# Patient Record
Sex: FEMALE | Race: WHITE | HISPANIC OR LATINO | Employment: OTHER | URBAN - METROPOLITAN AREA
[De-identification: names, ages, dates, MRNs, and addresses within clinical notes are randomized per-mention and may not be internally consistent; named-entity substitution may affect disease eponyms.]

---

## 2019-05-02 PROBLEM — N87.9 CERVICAL DYSPLASIA: Status: ACTIVE | Noted: 2019-05-02

## 2019-05-02 PROBLEM — R10.2 PELVIC PAIN IN FEMALE: Status: ACTIVE | Noted: 2019-05-02

## 2019-09-16 PROBLEM — G56.01 CARPAL TUNNEL SYNDROME OF RIGHT WRIST: Status: ACTIVE | Noted: 2019-09-16

## 2019-09-16 PROBLEM — K76.0 FATTY LIVER DISEASE, NONALCOHOLIC: Status: ACTIVE | Noted: 2019-09-16

## 2020-02-03 PROBLEM — N87.1 MODERATE DYSPLASIA OF CERVIX (CIN II): Status: ACTIVE | Noted: 2017-03-20

## 2020-02-03 PROBLEM — F41.9 ANXIETY: Status: ACTIVE | Noted: 2020-02-03

## 2020-02-03 PROBLEM — K59.00 CONSTIPATION: Status: ACTIVE | Noted: 2019-06-14

## 2020-02-03 PROBLEM — Z90.710 S/P LAPAROSCOPIC HYSTERECTOMY: Status: ACTIVE | Noted: 2019-06-04

## 2020-02-03 PROBLEM — R74.01 TRANSAMINITIS: Status: ACTIVE | Noted: 2019-06-14

## 2020-02-05 PROBLEM — R35.89 POLYURIA: Chronic | Status: ACTIVE | Noted: 2020-02-05

## 2020-03-16 PROBLEM — N39.46 MIXED INCONTINENCE URGE AND STRESS: Status: ACTIVE | Noted: 2020-03-16

## 2020-05-18 PROBLEM — F43.10 PTSD (POST-TRAUMATIC STRESS DISORDER): Status: ACTIVE | Noted: 2020-02-03

## 2020-05-18 PROBLEM — F33.1 MODERATE EPISODE OF RECURRENT MAJOR DEPRESSIVE DISORDER: Status: ACTIVE | Noted: 2020-05-18

## 2020-06-08 PROBLEM — R11.2 INTRACTABLE NAUSEA AND VOMITING: Status: ACTIVE | Noted: 2020-06-08

## 2020-06-08 PROBLEM — R10.11 RIGHT UPPER QUADRANT ABDOMINAL PAIN: Status: ACTIVE | Noted: 2020-06-08

## 2020-06-09 PROBLEM — R10.11 RIGHT UPPER QUADRANT ABDOMINAL PAIN: Status: RESOLVED | Noted: 2020-06-08 | Resolved: 2020-06-09

## 2020-06-09 PROBLEM — R11.2 INTRACTABLE NAUSEA AND VOMITING: Status: RESOLVED | Noted: 2020-06-08 | Resolved: 2020-06-09

## 2020-06-19 PROBLEM — R10.9 FLANK PAIN, ACUTE: Status: ACTIVE | Noted: 2020-06-19

## 2020-06-19 PROBLEM — R10.9 ABDOMINAL PAIN: Status: ACTIVE | Noted: 2020-06-19

## 2020-06-20 PROBLEM — R10.9 ABDOMINAL PAIN: Status: RESOLVED | Noted: 2020-06-19 | Resolved: 2020-06-20

## 2020-06-20 PROBLEM — K80.50 BILIARY COLIC: Status: ACTIVE | Noted: 2020-06-08

## 2020-07-07 DIAGNOSIS — U07.1 COVID-19 VIRUS DETECTED: ICD-10-CM

## 2020-07-12 PROBLEM — R11.10 INTRACTABLE VOMITING: Status: ACTIVE | Noted: 2020-07-12

## 2020-07-12 PROBLEM — R10.11 RIGHT UPPER QUADRANT ABDOMINAL PAIN: Status: ACTIVE | Noted: 2020-07-12

## 2020-07-13 PROBLEM — R32 URINARY INCONTINENCE: Status: ACTIVE | Noted: 2020-07-13

## 2020-07-13 PROBLEM — K21.9 GERD (GASTROESOPHAGEAL REFLUX DISEASE): Status: ACTIVE | Noted: 2020-07-13

## 2020-07-13 PROBLEM — U07.1 COVID-19: Status: ACTIVE | Noted: 2020-07-13

## 2020-07-15 PROBLEM — R11.10 INTRACTABLE VOMITING: Status: RESOLVED | Noted: 2020-07-12 | Resolved: 2020-07-15

## 2020-07-25 PROBLEM — E66.9 CLASS 1 OBESITY IN ADULT: Status: ACTIVE | Noted: 2020-07-25

## 2020-07-25 PROBLEM — K80.20 SYMPTOMATIC CHOLELITHIASIS: Status: ACTIVE | Noted: 2020-07-25

## 2020-07-26 PROBLEM — I95.9 HYPOTENSION: Status: ACTIVE | Noted: 2020-07-26

## 2020-07-27 PROBLEM — U07.1 COVID-19: Status: RESOLVED | Noted: 2020-07-13 | Resolved: 2020-07-27

## 2020-07-27 PROBLEM — M25.511 ACUTE PAIN OF RIGHT SHOULDER: Status: ACTIVE | Noted: 2020-07-27

## 2020-07-27 PROBLEM — R11.10 INTRACTABLE VOMITING: Status: RESOLVED | Noted: 2020-07-12 | Resolved: 2020-07-27

## 2020-07-27 PROBLEM — K80.50 BILIARY COLIC: Status: RESOLVED | Noted: 2020-06-08 | Resolved: 2020-07-27

## 2020-07-27 PROBLEM — K80.20 SYMPTOMATIC CHOLELITHIASIS: Status: RESOLVED | Noted: 2020-07-25 | Resolved: 2020-07-27

## 2020-07-30 PROBLEM — F33.1 MODERATE EPISODE OF RECURRENT MAJOR DEPRESSIVE DISORDER: Chronic | Status: ACTIVE | Noted: 2020-05-18

## 2020-11-18 PROBLEM — F31.81 BIPOLAR II DISORDER, MOST RECENT EPISODE MAJOR DEPRESSIVE: Chronic | Status: ACTIVE | Noted: 2020-05-18

## 2020-11-18 PROBLEM — F31.81 BIPOLAR II DISORDER, MOST RECENT EPISODE MAJOR DEPRESSIVE: Status: ACTIVE | Noted: 2020-05-18

## 2021-01-12 PROBLEM — R10.2 PELVIC PAIN IN FEMALE: Status: RESOLVED | Noted: 2019-05-02 | Resolved: 2021-01-12

## 2021-01-25 PROBLEM — Z98.890 HISTORY OF SUBURETHRAL SLING PROCEDURE: Status: ACTIVE | Noted: 2021-01-25

## 2021-03-26 PROBLEM — N63.10 BREAST MASS, RIGHT: Status: ACTIVE | Noted: 2021-03-26

## 2021-05-12 ENCOUNTER — PATIENT MESSAGE (OUTPATIENT)
Dept: RESEARCH | Facility: HOSPITAL | Age: 36
End: 2021-05-12

## 2021-06-05 PROBLEM — N20.1 OBSTRUCTION OF LEFT URETEROPELVIC JUNCTION (UPJ) DUE TO STONE: Status: ACTIVE | Noted: 2021-06-05

## 2021-06-05 PROBLEM — N20.1 LEFT URETERAL CALCULUS: Status: ACTIVE | Noted: 2021-06-05

## 2021-06-18 PROBLEM — Z01.818 PRE-OP EVALUATION: Status: ACTIVE | Noted: 2021-06-18

## 2021-11-02 PROBLEM — M54.9 INTRACTABLE BACK PAIN: Status: ACTIVE | Noted: 2021-11-02

## 2021-11-17 PROBLEM — M54.50 LUMBAR BACK PAIN: Status: ACTIVE | Noted: 2021-11-02

## 2022-02-23 PROBLEM — N39.46 MIXED INCONTINENCE URGE AND STRESS: Status: RESOLVED | Noted: 2020-03-16 | Resolved: 2022-02-23

## 2022-02-23 PROBLEM — R32 URINARY INCONTINENCE: Status: RESOLVED | Noted: 2020-07-13 | Resolved: 2022-02-23

## 2022-03-01 PROBLEM — G47.00 INSOMNIA: Status: ACTIVE | Noted: 2022-03-01

## 2022-03-20 PROBLEM — N20.0 BILATERAL NEPHROLITHIASIS: Status: ACTIVE | Noted: 2022-03-20

## 2022-03-20 PROBLEM — R11.0 NAUSEA: Status: ACTIVE | Noted: 2022-03-20

## 2022-05-14 PROBLEM — N20.1 OBSTRUCTION OF LEFT URETEROPELVIC JUNCTION (UPJ) DUE TO STONE: Status: RESOLVED | Noted: 2021-06-05 | Resolved: 2022-05-14

## 2022-05-14 PROBLEM — R11.10 INTRACTABLE VOMITING: Status: RESOLVED | Noted: 2020-07-12 | Resolved: 2022-05-14

## 2022-11-22 PROBLEM — M54.12 CERVICAL RADICULOPATHY: Status: ACTIVE | Noted: 2022-11-22

## 2023-05-21 PROBLEM — R10.31 RIGHT LOWER QUADRANT ABDOMINAL PAIN: Status: ACTIVE | Noted: 2023-05-21

## 2023-05-24 ENCOUNTER — PATIENT OUTREACH (OUTPATIENT)
Dept: ADMINISTRATIVE | Facility: CLINIC | Age: 38
End: 2023-05-24

## 2023-05-24 NOTE — PROGRESS NOTES
C3 nurse attempted to contact Aiden Garcia  for a TCC post hospital discharge follow up call. No answer. Left voicemail with callback information. The patient does not have a scheduled HOSFU appointment. Message sent to PCP staff for assistance with scheduling visit with patient.

## 2023-05-24 NOTE — PROGRESS NOTES
C3 nurse spoke with Aiden Garcia  for a TCC post hospital discharge follow up call. The patient does not have a scheduled HOSFU appointment with Anthony Wise MD  within 5-7 days post hospital discharge date 5/23/23. C3 nurse was unable to schedule HOSFU appointment in [facility].    Message sent to PCP staff requesting they contact patient and schedule follow up appointment.

## 2023-05-26 ENCOUNTER — NURSE TRIAGE (OUTPATIENT)
Dept: ADMINISTRATIVE | Facility: CLINIC | Age: 38
End: 2023-05-26

## 2023-05-26 NOTE — TELEPHONE ENCOUNTER
Spoke with pt. Confirmed she is enroute to the ED for eval of intractable pain. Messaged gen surg team [facility]risti/Leonel.

## 2023-05-26 NOTE — TELEPHONE ENCOUNTER
OOC outgoing call -     Pt c/o had appendix removal on Sunday d/c on tues, pain medication not helping, can barely get out of bed, pain 9.5/10 at incision site. Post op protocol followed and pt advised to go to the ER now for further assessment and care, don't drive yourself and if you cannot make it there for any reason to call 911 now instead for this may be considered a medical emergency, education provided on possible infection or complication and need to be reevaluated and tx in the er now. Alert and oriented, Pt agrees to follow through with dispo. Encounter routed to PCP/staff and sx/staff as high prority.   Reason for Disposition   SEVERE pain in the incision    Additional Information   Negative: Major abdominal surgical incision and wound gaping open with visible internal organs   Negative: Sounds like a life-threatening emergency to the triager   Negative: Bleeding from incision and won't stop after 10 minutes of direct pressure   Negative: Bleeding (more than a few drops) from incision and after blood vessel surgery (e.g., carotidendarterectomy, femoral bypass graft, kidney dialysis fistula, tracheostomy)   Negative: Bright red, wide-spread, sunburn-like rash    Protocols used: Post-Op Incision Symptoms and [id number]

## 2023-08-08 ENCOUNTER — PES CALL (OUTPATIENT)
Dept: ADMINISTRATIVE | Facility: CLINIC | Age: 38
End: 2023-08-08

## 2023-09-12 PROBLEM — Z90.49 HISTORY OF LAPAROSCOPIC APPENDECTOMY: Status: ACTIVE | Noted: 2023-09-12

## 2023-09-12 PROBLEM — R11.14 BILIOUS VOMITING WITH NAUSEA: Status: ACTIVE | Noted: 2022-03-20

## 2023-09-14 PROBLEM — R10.9 LEFT FLANK PAIN: Status: ACTIVE | Noted: 2023-09-14

## 2023-09-14 PROBLEM — R34 DECREASED URINE OUTPUT: Status: ACTIVE | Noted: 2023-09-14

## 2023-09-18 ENCOUNTER — PATIENT OUTREACH (OUTPATIENT)
Dept: ADMINISTRATIVE | Facility: CLINIC | Age: 38
End: 2023-09-18

## 2023-09-18 ENCOUNTER — PATIENT MESSAGE (OUTPATIENT)
Dept: ADMINISTRATIVE | Facility: CLINIC | Age: 38
End: 2023-09-18

## 2023-09-18 NOTE — PROGRESS NOTES
C3 nurse attempted to contact Aiden Garcia  for a TCC post hospital discharge follow up call. No answer. Left voicemail with callback information. The patient has a scheduled HOSFU appointment with TIMMY Wise on 09/25/2023 @ 1100.

## 2024-02-22 ENCOUNTER — PATIENT MESSAGE (OUTPATIENT)
Dept: URGENT CARE | Facility: CLINIC | Age: 39
End: 2024-02-22
Payer: MEDICARE

## 2024-03-01 PROBLEM — K58.9 IRRITABLE BOWEL SYNDROME: Status: ACTIVE | Noted: 2024-03-01

## 2024-04-14 PROBLEM — R10.12 LEFT UPPER QUADRANT PAIN: Status: ACTIVE | Noted: 2024-04-14

## 2024-04-18 ENCOUNTER — PATIENT MESSAGE (OUTPATIENT)
Dept: ADMINISTRATIVE | Facility: CLINIC | Age: 39
End: 2024-04-18
Payer: MEDICARE

## 2024-04-18 ENCOUNTER — PATIENT OUTREACH (OUTPATIENT)
Dept: ADMINISTRATIVE | Facility: CLINIC | Age: 39
End: 2024-04-18
Payer: MEDICARE

## 2024-04-18 NOTE — PROGRESS NOTES
C3 nurse attempted to contact Aiden Garcia for a TCC post hospital discharge follow up call. No answer. Left voicemail with callback information. The patient has a scheduled [facility] appointment with Eliu Meadows on 04/23/2024 @ 1130.

## 2024-04-19 NOTE — PROGRESS NOTES
C3 nurse attempted to contact Aiden Garcia for a TCC post hospital discharge follow up call. No answer. Left voicemail with callback information. The patient has a scheduled [facility] appointment with Eliu Meadows on 04/23/2024 @ 1130.

## 2024-04-22 ENCOUNTER — NURSE TRIAGE (OUTPATIENT)
Dept: ADMINISTRATIVE | Facility: CLINIC | Age: 39
End: 2024-04-22
Payer: MEDICARE

## 2024-04-22 NOTE — PROGRESS NOTES
C3 nurse attempted to contact Aiden Garcia for a TCC post hospital discharge follow up call. No answer. Left voicemail with callback information. The patient has a scheduled [facility] appointment with Eliu Meadows on 04/23/2024 @ 1130.

## 2024-04-22 NOTE — TELEPHONE ENCOUNTER
Teri reports abdominal pain, nausea and diarrhea. Advised pt per triage protocol to go to nearest ED now for physician evelyne. Instructed to call  now if no immediate . V/u.   Reason for Disposition   [1] SEVERE pain (e.g., excruciating) AND [2] present > 1 hour    Additional Information   Negative: Shock suspected (e.g., cold/pale/clammy skin, too weak to stand, low BP, rapid pulse)   Negative: Difficult to awaken or acting confused (e.g., disoriented, slurred speech)   Negative: Passed out (i.e., lost consciousness, collapsed and was not responding)   Negative: Sounds like a life-threatening emergency to the triager    Protocols used: Abdominal Pain - Female-A-AH

## 2024-04-26 ENCOUNTER — ON-DEMAND VIRTUAL (OUTPATIENT)
Dept: URGENT CARE | Facility: CLINIC | Age: 39
End: 2024-04-26
Payer: MEDICARE

## 2024-04-26 DIAGNOSIS — R19.7 DIARRHEA, UNSPECIFIED TYPE: ICD-10-CM

## 2024-04-26 DIAGNOSIS — R10.9 ABDOMINAL PAIN, UNSPECIFIED ABDOMINAL LOCATION: Primary | ICD-10-CM

## 2024-04-26 PROCEDURE — 99203 OFFICE O/P NEW LOW 30 MIN: CPT | Mod: 95,,, | Performed by: NURSE PRACTITIONER

## 2024-04-26 NOTE — PROGRESS NOTES
Subjective:      Patient ID: Aiden Garcia is a 38 y.o. female.    Vitals:  vitals were not taken for this visit.     Chief Complaint: Abdominal Pain      Visit Type: TELE AUDIOVISUAL    Present with the patient at the time of consultation: TELEMED PRESENT WITH PATIENT: None    Past Medical History:   Diagnosis Date    Anxiety     ANGELIQUE II (cervical intraepithelial neoplasia II) 06/2019    Fatty liver disease, nonalcoholic 09/16/2019    Gallbladder abscess     GERD (gastroesophageal reflux disease)     Renal disorder      Past Surgical History:   Procedure Laterality Date    APPENDECTOMY      CHOLECYSTECTOMY      CYSTOSCOPY      CYSTOSCOPY W/ URETERAL STENT PLACEMENT Left 06/05/2021    Procedure: CYSTOSCOPY, WITH URETERAL STENT INSERTION;  Surgeon: Joseph Boyd MD;  Location: [facility]"" MAIN OR;  Service: Urology;  Laterality: Left;    DIAGNOSTIC LAPAROSCOPY N/A 05/21/2023    Procedure: LAPAROSCOPY, DIAGNOSTIC;  Surgeon: John Ewing MD;  Location: [facility]\"" MAIN OR;  Service: General;  Laterality: N/A;    KIDNEY STONE SURGERY      LAPAROSCOPIC APPENDECTOMY N/A 05/21/2023    Procedure: APPENDECTOMY, LAPAROSCOPIC;  Surgeon: John Ewing MD;  Location: [facility]\"" MAIN OR;  Service: General;  Laterality: N/A;    LAPAROSCOPIC CHOLECYSTECTOMY N/A 07/26/2020    Procedure: CHOLECYSTECTOMY, LAPAROSCOPIC VERSUS OPEN;  Surgeon: Roshan Ayala MD;  Location: [facility]\"" MAIN OR;  Service: Oncology;  Laterality: N/A;    LAPAROSCOPIC TOTAL HYSTERECTOMY  06/2019    SURGICAL PROCEDURE FOR STRESS INCONTINENCE USING TENSION FREE VAGINAL TAPE N/A 01/25/2021    Procedure: SURGICAL PROCEDURE, USING TENSION FREE VAGINAL TAPE, FOR STRESS INCONTINENCE, cysto;  Surgeon: Polo Macario MD;  Location: [facility]\"" MAIN OR;  Service: OB/GYN;  Laterality: N/A;  abd - superficial layers    TUBAL LIGATION       Review of patient's allergies indicates:   Allergen Reactions    Adhesive      Bad skin reaction    Ketorolac Hives     Current Outpatient  Medications on File Prior to Visit   Medication Sig Dispense Refill    DULoxetine (CYMBALTA) 30 MG capsule Take 1 capsule (30 mg total) by mouth once daily. 30 capsule 0    methocarbamoL (ROBAXIN) 750 MG Tab Take 1 tablet (750 mg total) by mouth 2 (two) times daily as needed (for back pain). 40 tablet 0    pantoprazole (PROTONIX) 40 MG tablet Take 1 tablet (40 mg total) by mouth daily as needed (for heartburn/GERD symptoms). 90 tablet 0     No current facility-administered medications on file prior to visit.     Family History   Problem Relation Name Age of Onset    Heart attack Father      Cervical cancer Paternal Aunt  30 - 39    Breast cancer Neg Hx      Colon cancer Neg Hx      Ovarian cancer Neg Hx      Endometrial cancer Neg Hx      Pancreatic cancer Neg Hx             Ohs Peq Odvv Intake    4/26/2024  9:42 AM CDT - Filed by Patient   What is your current physical address in the event of a medical emergency? [address] [place] [id number]   Are you able to take your vital signs? No   Please attach any relevant images or files          38 year old female reports sharp left side stomach pain 9/10 for five days with diarrhea. She denies new foods.     Abdominal Pain  This is a new problem. Episode onset: 5 days ago. The onset quality is sudden. The problem occurs intermittently. The problem has been gradually worsening. The pain is at a severity of 9/10. The quality of the pain is sharp. Associated symptoms include diarrhea. The pain is aggravated by eating. The pain is relieved by Nothing.       Gastrointestinal:  Positive for abdominal pain and diarrhea.        Objective:   The physical exam was conducted virtually.  Physical Exam   Constitutional: She is oriented to person, place, and time. She appears ill. No distress.   HENT:   Head: Normocephalic and atraumatic.   Mouth/Throat: Oropharynx is clear and moist and mucous membranes are normal.   Eyes: Conjunctivae are normal. No scleral icterus.    Pulmonary/Chest: Effort normal. No respiratory distress.   Musculoskeletal: Normal range of motion.         General: Normal range of motion.   Neurological: She is alert and oriented to person, place, and time.   Skin: Skin is not diaphoretic.   Psychiatric: Her behavior is normal. Judgment and thought content normal.   Vitals reviewed.      Assessment:     1. Abdominal pain, unspecified abdominal location    2. Diarrhea, unspecified type        Plan:       Abdominal pain, unspecified abdominal location    Diarrhea, unspecified type    Advised to go into the nearest ER or urgent care for further assessment.